# Patient Record
Sex: FEMALE | Race: OTHER | HISPANIC OR LATINO | Employment: UNEMPLOYED | URBAN - METROPOLITAN AREA
[De-identification: names, ages, dates, MRNs, and addresses within clinical notes are randomized per-mention and may not be internally consistent; named-entity substitution may affect disease eponyms.]

---
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
## 2021-06-12 ENCOUNTER — TRANSCRIBE ORDERS (OUTPATIENT)
Dept: ADMINISTRATIVE | Facility: HOSPITAL | Age: 21
End: 2021-06-12

## 2021-06-12 ENCOUNTER — APPOINTMENT (OUTPATIENT)
Dept: LAB | Facility: MEDICAL CENTER | Age: 21
End: 2021-06-12

## 2021-06-12 DIAGNOSIS — Z11.1 SCREENING EXAMINATION FOR PULMONARY TUBERCULOSIS: ICD-10-CM

## 2021-06-12 DIAGNOSIS — Z11.1 SCREENING EXAMINATION FOR PULMONARY TUBERCULOSIS: Primary | ICD-10-CM

## 2021-06-12 PROCEDURE — 86480 TB TEST CELL IMMUN MEASURE: CPT

## 2021-06-12 PROCEDURE — 36415 COLL VENOUS BLD VENIPUNCTURE: CPT

## 2021-06-17 LAB
GAMMA INTERFERON BACKGROUND BLD IA-ACNC: 0.02 IU/ML
M TB IFN-G BLD-IMP: NEGATIVE
M TB IFN-G CD4+ BCKGRND COR BLD-ACNC: 0.03 IU/ML
M TB IFN-G CD4+ BCKGRND COR BLD-ACNC: 0.12 IU/ML
MITOGEN IGNF BCKGRD COR BLD-ACNC: >10 IU/ML

## 2023-06-27 ENCOUNTER — TELEPHONE (OUTPATIENT)
Dept: PSYCHIATRY | Facility: CLINIC | Age: 23
End: 2023-06-27

## 2023-06-27 NOTE — TELEPHONE ENCOUNTER
Patient has been added to the Medication Management wait list without a referral     Insurance: [employer]  Insurance Type:    Commercial []   Medicaid [x]   [place] (if applicable)   Medicare []  [facility] Pre[facility]  Provider Preference: no prov pref  Virtual: Yes [] No [x]

## 2024-07-31 ENCOUNTER — TELEPHONE (OUTPATIENT)
Age: 24
End: 2024-07-31

## 2024-07-31 NOTE — TELEPHONE ENCOUNTER
"Behavioral Health Outpatient Intake Questions    Referred By   : SELF    Please advise interviewee that they need to answer all questions truthfully to allow for best care, and any misrepresentations of information may affect their ability to be seen at this clinic   => Was this discussed? Yes     If Minor Child (under age 18)    Who is/are the legal guardian(s) of the child?     Is there a custody agreement?      If \"YES\"- Custody orders must be obtained prior to scheduling the first appointment  In addition, Consent to Treatment must be signed by all legal guardians prior to scheduling the first appointment    If \"NO\"- Consent to Treatment must be signed by all legal guardians prior to scheduling the first appointment    Behavioral Health Outpatient Intake History -     Presenting Problem (in patient's own words):     Pt stated that a couple years ago pt had manic episodes and was Dx with schizophrenia. Pt has been receiving meds and therapy and has been improving.  Pt is now looking for someone local due to coming home from college.  Pt needs to switch over.    Are there any communication barriers for this patient?     No                                               If yes, please describe barriers:   If there is a unique situation, please refer to Tobi Grimaldo/Eneida Carter for final determination.    Are you taking any psychiatric medications? Yes     If \"YES\" -What are they  Abilify    If \"YES\" -Who prescribes? Dr. St, pt is switching PCPs due to moving.      Has the Patient previously received outpatient Talk Therapy or Medication Management from [facility]  No        If \"YES\"- When, Where and with Whom?         If \"NO\" -Has Patient received these services elsewhere?       If \"YES\" -When, Where, and with Whom? Psychiatrist/Therapist at [facility] for 2 years, New Therapist for 1 month, have not met with new Psychiatrist    Has the Patient abused alcohol or other substances in the last 6 months ? No  No concerns " "of substance abuse are reported.     If \"YES\" -What substance, How much, How often?     If illegal substance: Refer to [facility] (for [facility]) or [facility].   If Alcohol in excess of 10 drinks per week:  Refer to [facility] (for [facility]) or [facility]    Legal History-     Is this treatment court ordered? No   If \"yes \"send to :  Talk Therapy : Send to Tobi Grimaldo/Eneida Carter for final determination   Med Management: Send to Dr Lindo for final determination     Has the Patient been convicted of a felony?  No   If \"Yes\" send to -When, What?  Talk Therapy: Send to Tobi Carter for final determination   Med Management: Send to Dr Lindo for final determination     ACCEPTED as a patient Yes  If \"Yes\" Appointment Date:   Dr. Pillai 11/5 @ 2PM & 12/3 @ 330PM    Referred Elsewhere? No  If “Yes” - (Where? Ex: [facility], [facility], [facility], [facility], etc.)       Name of Insurance Co:[employer] [facility]  Insurance ID#[id number]   Insurance Phone #  If ins is primary or secondary?PRIMARY  If patient is a minor, parents information such as Name, D.O.B of guarantor.  "

## 2024-08-07 ENCOUNTER — TELEPHONE (OUTPATIENT)
Dept: PSYCHIATRY | Facility: CLINIC | Age: 24
End: 2024-08-07

## 2024-08-07 NOTE — TELEPHONE ENCOUNTER
One week follow up call for New Patient appointment with Cameron Pillai MD on 11/05/2024  was made on 08/07/2024 . Writer informed patient of New Patient paperwork needing to be completed 5 days prior to the appointment. Writer confirmed paperwork has been sent via Mail.    Appointment was made on: 07/31/2024

## 2024-11-04 ENCOUNTER — TELEPHONE (OUTPATIENT)
Age: 24
End: 2024-11-04

## 2024-11-04 NOTE — TELEPHONE ENCOUNTER
Patient is calling regarding cancelling an appointment.    Date/Time: 11/5/2024 at 2 pm New patient appt    Reason: pts work schedule changed since the appt was scheduled    Patient was rescheduled: YES [x] NO []  If yes, when was Patient reschedule for: 2/11/2025 at 2 pm. New patient appt    Patient requesting call back to reschedule: YES [] NO [x]

## 2024-11-11 ENCOUNTER — TELEPHONE (OUTPATIENT)
Dept: PSYCHIATRY | Facility: CLINIC | Age: 24
End: 2024-11-11

## 2024-11-11 NOTE — TELEPHONE ENCOUNTER
One week follow up call for New Patient appointment with   Cameron Pillai MD   on 02/11/2025 was made on 11/11/2024. Writer informed patient of New Patient paperwork needing to be completed 5 days prior to the appointment. Writer confirmed paperwork has been sent via Mail.    Appointment was made on: 11/04/2024

## 2025-01-09 ENCOUNTER — TELEPHONE (OUTPATIENT)
Dept: OTHER | Facility: OTHER | Age: 25
End: 2025-01-09

## 2025-01-09 ENCOUNTER — TELEPHONE (OUTPATIENT)
Age: 25
End: 2025-01-09

## 2025-01-09 NOTE — TELEPHONE ENCOUNTER
Patient called in to inquire if they could be added to a cancellation list for the 2.11.25 appointment.    If a sooner appointment opens, the patient was advised they would be contacted by [facility].

## 2025-01-09 NOTE — TELEPHONE ENCOUNTER
Enid stating she has an apt with psych coming up and she wanted to confirm it.  I cannot see upcoming apts.  Please call patient to confirm.    Thank you

## 2025-02-11 ENCOUNTER — OFFICE VISIT (OUTPATIENT)
Dept: PSYCHIATRY | Facility: CLINIC | Age: 25
End: 2025-02-11
Payer: COMMERCIAL

## 2025-02-11 ENCOUNTER — TELEPHONE (OUTPATIENT)
Age: 25
End: 2025-02-11

## 2025-02-11 DIAGNOSIS — F63.0 GAMBLING DISORDER, MODERATE: ICD-10-CM

## 2025-02-11 DIAGNOSIS — F12.20 CANNABIS USE DISORDER, MODERATE, DEPENDENCE (HCC): ICD-10-CM

## 2025-02-11 DIAGNOSIS — F31.32 BIPOLAR I DISORDER, MOST RECENT EPISODE DEPRESSED, MODERATE (HCC): Primary | ICD-10-CM

## 2025-02-11 PROBLEM — F31.64 BIPOLAR AFFECTIVE DISORDER, MIXED, SEVERE, WITH PSYCHOTIC BEHAVIOR (HCC): Status: RESOLVED | Noted: 2019-10-04 | Resolved: 2025-02-11

## 2025-02-11 PROBLEM — F31.5 BIPOLAR DISORDER, CURR EPISODE DEPRESSED, SEVERE, W/PSYCHOTIC FEATURES (HCC): Status: ACTIVE | Noted: 2019-10-04

## 2025-02-11 PROCEDURE — 90792 PSYCH DIAG EVAL W/MED SRVCS: CPT | Performed by: STUDENT IN AN ORGANIZED HEALTH CARE EDUCATION/TRAINING PROGRAM

## 2025-02-11 RX ORDER — ARIPIPRAZOLE 10 MG/1
10 TABLET ORAL DAILY
COMMUNITY
End: 2025-02-11 | Stop reason: SDUPTHER

## 2025-02-11 RX ORDER — FLUOXETINE 10 MG/1
10 CAPSULE ORAL DAILY
COMMUNITY
End: 2025-02-11 | Stop reason: SDUPTHER

## 2025-02-11 RX ORDER — ARIPIPRAZOLE 20 MG/1
20 TABLET ORAL DAILY
Qty: 30 TABLET | Refills: 0 | Status: SHIPPED | OUTPATIENT
Start: 2025-02-11 | End: 2025-02-21 | Stop reason: SDUPTHER

## 2025-02-11 NOTE — BH RECOVERY SUPPORT
[facility] PARTIAL PROGRAM REFERRAL     [facility]    Name and Date of Birth:  Enid Walsh 24 y.o. 2000    Date of Referral: February 11, 2025    Referral Source (Agency/Name): [facility] Demographics on file:  Yes     Emergency contact on file: Yes     Insurance on file: Yes     _____________________________________________      Presenting Symptoms and Stressors:      Please describe the reason for Referral: History of bipolar disorder with first manic episode occurring at age 19 ; worsening depression symptoms, anhedonia, anergia, poor self-care, decreased concentration. Increased feelings of guilt. Trying to hold job and improve mental health as she aspires to return back to college and study a PhD. Struggling with gambling addiction as well as cannabis use.    Stressors: drug use problems, family issues, and job stress    Symptoms:  worsening depression    Suicidal Ideation: None    Homicidal Ideation: None    Depressed Mood: Yes, anhedonia, sadness, hopelessness, low energy, low motivation, decreased interest, excessive guilt, poor concentration, ruminations, negative thoughts    Romelia/Hypomania: None now though past episodes    Psychosis: None now but history of paranoia    Agitation: No    Appetite Changes: increased appetite    Sleep Disturbance: hypersomnia    Access to Weapons:  No    Smoking Status: vaping nicotine    Substance Use:  Cannabis: current use, uses daily  If Use : Last use yesterday   If Use: Current SA treatment: none    Current Psychiatrist or Therapist:    Psychiatrist: Dr. Pillai  Therapist: None    Past Psychiatric Treatment: [facility]    If currently Inpatient - Date of Anticipated discharge: n/a    Diagnoses:  Bipolar Disorder, type I, depressed, moderate  Cannabis dependence    Do they Require Ambulatory Assistance: No    Communication Assistance: not required     Legal Issues: None        Cameron Pillai MD

## 2025-02-11 NOTE — BH TREATMENT PLAN
TREATMENT PLAN (Medication Management Only)        [facility]    Name and Date of Birth:  Enid Walsh 24 y.o. 2000  Date of Treatment Plan: February 11, 2025  Diagnosis/Diagnoses:    1. Bipolar I disorder, most recent episode depressed, moderate (HCC)    2. Gambling disorder, moderate    3. Cannabis use disorder, moderate, dependence (HCC)      Strengths/Personal Resources for Self-Care: supportive family, supportive friends.  Area/Areas of need (in own words): depression  1. Long Term Goal: improve control of depression.  Target Date:6 months - 8/11/2025  Person/Persons responsible for completion of goal: Enid  2.  Short Term Objective (s) - How will we reach this goal?:   A. Provider new recommended medication/dosage changes and/or continue medication(s): continue current medications as prescribed.  B. N/A.  C. N/A.  Target Date:6 months - 8/11/2025  Person/Persons Responsible for Completion of Goal: Enid  Progress Towards Goals: continuing treatment  Treatment Modality: medication management every 3 months  Review due 180 days from date of this plan: 6 months - 8/11/2025  Expected length of service: maintenance  My Physician/PA/NP and I have developed this plan together and I agree to work on the goals and objectives. I understand the treatment goals that were developed for my treatment.

## 2025-02-11 NOTE — TELEPHONE ENCOUNTER
Please make the Prior Authorization aware once the office note from today is signed so that the PA can be submitted for the pt thank you

## 2025-02-11 NOTE — PSYCH
Psychiatric Evaluation  [facility]       Name and Date of Birth:  Enid Walsh 24 y.o. 2000 MRN: [id number]    Date of Visit: February 11, 2025    Reason for visit: Initial psychiatric intake assessment    Chief complaint: I am looking for a new psychiatrist.    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 Enid is joined by her mother who provides collateral information.  Patient reports that while age 19 she had her first psychotic/manic episode.  Reports that she recently moved to college at that time and began to decompensate.  She was not sleeping for 3 to 4 days at a time, had decreased appetite and decreased self-care.  She became more paranoid, believes that the government in various agencies were tracking her phone.  She began to have delusions of reference, believing that celebrities were sending her messages through the computer and television.  She reports that she was hospitalized at that time and started antipsychotic medication, Latuda.  She then began outpatient treatment and took a semester of a leave of absence.  Her symptoms stabilized and she had done relatively well for a number of years, eventually transitioning to Abilify due to side effects from Latuda.  She then had another manic episode in her adali year of college and was again hospitalized.  She ultimately graduated college and is now residing [place] with her mother.    She reports that over the past few months she has been struggling with decompensated mental health symptoms.  Her mother is concerned particularly about increased depression.  She reports having poor ADLs and self-care.  She is more anhedonic, increased feelings of guilt, anergic, poor concentration with increased appetite and  weight gain.  Patient also admits to using cannabis more heavily, smoking THC on a daily basis.  She also has been struggling with a gambling addiction, spending large quantities of money with online casino apps.  Patient's mother is now in charge of managing her finances.  The patient reports that she has been feeling increasingly guilty about lack of control of spending money and cannabis use, feels that this has negatively impacted her ability to hold a job and is concerned that she aspires to eventually get a PhD and return back to college in the future.  She reports feeling that her previous medication regimen had stabilized her although seems to be less effective.  Due to lapse in psychiatric services she had been seeing intermittent PCP and providers in between, recently started on Prozac to assist with depressive symptoms.  She feels this has limited benefit thus far.  She denies currently any suicidal or homicidal ideations.  Denies any auditory or visual hallucinations.  Denies any paranoia or delusional symptoms currently.  No manic or hypomanic symptoms.  No history of eating disorder or obsessive-compulsive disorder.    Current Rating Scores:     Current PHQ-9   PHQ-2/9 Depression Screening             Psychiatric Review Of Systems:    Appetite: increased  Adverse eating: no  Weight changes: increased  Insomnia/sleeplessness: decreased  Fatigue/anergy: decreased  Anhedonia/lack of interest: decreased  Attention/concentration: decreased  Psychomotor agitation/retardation: decreased  Somatic symptoms: no  Anxiety/panic attack: no  Romelia/hypomania: past episodes  Hopelessness/helplessness/worthlessness: no  Self-injurious behavior/high-risk behavior: no  Suicidal ideation: no  Homicidal ideation: no  Auditory hallucinations: no  Visual hallucinations: no  Other perceptual disturbances: no  Delusional thinking: no  Obsessive/compulsive symptoms: no    Review Of Systems:    Constitutional negative   ENT  negative   Cardiovascular negative   Respiratory negative   Gastrointestinal negative   Genitourinary negative   Musculoskeletal negative   Integumentary negative   Neurological negative   Endocrine negative   Other Symptoms none, all other systems are negative       Family Psychiatric History:     Family History   Problem Relation Age of Onset    Diabetes Maternal Uncle     Schizophrenia Cousin              Past Psychiatric History:     Previous inpatient psychiatric admissions: 2 total, first at age 19 and most recent 2023; has completed PHP in the past  Previous inpatient/outpatient substance abuse rehabilitation: denied.  Present/previous outpatient psychiatric treatment: [facility] in past.  Present/previous psychotherapy: [facility] in past.  History of suicidal attempts/gestures: none.  History of violence/aggressive behaviors: denied.  Present/previous psychotropic medication use: Latuda, Abilify.    Substance Abuse History:  Social History     Substance and Sexual Activity   Drug Use Never       Nicotine Use: vapes nicotine daily  Alcohol Use: social use  Cannabis Use: daily THC use  Illicit Substance Use: denied    Social History:    Academic history: college graduate; BS degree  Marital history: single  Children: 0  Social support system: parents  Residential history: Resides in [place]; lives with parents  Vocational History: training as   Access to firearms: denies direct access to weapons/firearms.   Legal History: denied    Traumatic History:     Abuse:none is reported       Past Medical History:    Past Medical History:   Diagnosis Date    TGA/IVS (transposition of great arteries, intact ventricular septum)         History reviewed. No pertinent surgical history.  No Known Allergies    History Review:    The following portions of the patient's history were reviewed and updated as appropriate: allergies, current medications, past family history, past medical history, past social history, past  surgical history, and problem list.    OBJECTIVE:    Vital signs in last 24 hours:    There were no vitals filed for this visit.    Mental Status Evaluation:    Appearance age appropriate, casually dressed   Behavior cooperative, calm   Speech normal rate, normal volume, normal pitch, soft   Mood depressed, anxious   Affect constricted   Thought Processes organized, goal directed   Associations intact associations   Thought Content no overt delusions   Perceptual Disturbances: no auditory hallucinations, no visual hallucinations   Abnormal Thoughts  Risk Potential Suicidal ideation - None  Homicidal ideation - None  Potential for aggression - No   Orientation oriented to person, place, time/date, and situation   Memory recent and remote memory grossly intact   Consciousness alert and awake   Attention Span Concentration Span attention span and concentration are age appropriate   Intellect appears to be of average intelligence   Insight intact   Judgement intact   Muscle Strength and  Gait normal muscle strength and normal muscle tone, normal gait and normal balance   Motor Activity no abnormal movements   Language no difficulty naming common objects, no difficulty repeating a phrase, no difficulty writing a sentence   Fund of Knowledge adequate knowledge of current events  adequate fund of knowledge regarding past history  adequate fund of knowledge regarding vocabulary    Pain none   Pain Scale 0       Laboratory Results: I have personally reviewed all pertinent laboratory/tests results    Recent Labs (last 4 months):   No visits with results within 4 Month(s) from this visit.   Latest known visit with results is:   Appointment on 06/12/2021   Component Date Value    QFT Nil 06/12/2021 0.02     QFT TB1-NIL 06/12/2021 0.03     QFT TB2-NIL 06/12/2021 0.12     QFT Mitogen-NIL 06/12/2021 >10.00     QFT Final Interpretation 06/12/2021 Negative        Suicide/Homicide Risk Assessment:    Risk of Harm to Self:  The  following ratings are based on assessment at the time of the interview  Demographic risk factors include:   Historical Risk Factors include: chronic mood disorder  Recent Specific Risk Factors include: diagnosis of mood disorder  Protective Factors: no current suicidal ideation, ability to adapt to change, able to manage anger well, compliant with medications, compliant with mental health treatment, connection to community, contact with caregivers, effective coping skills, effective decision-making skills, effective problem solving skills, good health  Weapons: none. The following steps have been taken to ensure weapons are properly secured: not applicable  Based on today's assessment, Enid presents the following risk of harm to self: minimal    Risk of Harm to Others:  The following ratings are based on assessment at the time of the interview  Demographic Risk Factors include: 16-25 years of age.  Historical Risk Factors include: none.  Recent Specific Risk Factors include: none.  Protective Factors: no current homicidal ideation, ability to adapt to change, able to manage anger well, access to mental health treatment, compliant with medications, compliant with mental health treatment, connection to community, contact with caregivers, cultural beliefs, effective coping skills, effective decision-making skills, effective problem solving skills  Weapons: none. The following steps have been taken to ensure weapons are properly secured: not applicable  Based on today's assessment, Enid presents the following risk of harm to others: minimal    The following interventions are recommended: continue medication management, continue psychotherapy. Although patient's acute lethality risk is low, long-term/chronic lethality risk is mildly elevated in the presence of bipolar disorder. At the current moment, Enid is future-oriented, forward-thinking, and demonstrates ability to act in a self-preserving manner as  evidenced by volitionally presenting to the clinic today, seeking treatment.To mitigate future risk, patient should adhere to the recommendations of this writer, avoid alcohol/illicit substance use, utilize community-based resources and familiar support and prioritize mental health treatment.       Summary:  Enid Walsh is a 24-year-old female who carries a past psychiatric history significant for bipolar disorder, cannabis use disorder, and [place] disorder that presents to establish care today at outpatient office.  She is transferring care from past providers due to insurance changes.  She describes what appears to be a manic episode that occurred around age 19, has had 2 total manic episodes that are characterized by insomnia, increased goal-directed activity, racing thoughts, poor ADLs, some paranoia and ideas of reference.  She initiated treatment with Latuda and was stabilized for many years, eventually transitioning to Abilify which she feels has been more effective. She graduated college and has moved back home with her mother of whom is good support.    [birth year] Currently she is struggling with worsening depressive symptoms.  Poor ADLs, decreased motivation, decreased self-care, anergia.  She also has been utilizing more cannabis, smoking daily as well as involved with excessive gambling addiction. Her mother is now managing her finances. She does not appear acutely manic now; she feels that her gambling has been driven by guilt and stress. We discussed how Abilify could be contributing to gambling behaviors though she feels it has not had this effect. We discussed increasing Abilify and Prozac to target depression and mood symptoms. We discussed referral to PHP as she would benefit from higher level of care at this time to improve coping skills and insight.    Assessment & Plan  Bipolar I disorder, most recent episode depressed, moderate (HCC)    Orders:    FLUoxetine (PROzac) 20 mg capsule; Take 1 capsule  (20 mg total) by mouth daily    ARIPiprazole (ABILIFY) 20 MG tablet; Take 1 tablet (20 mg total) by mouth daily    Ambulatory Referral to [facility] or Partial Psych Program; Future    Gambling disorder, moderate         Cannabis use disorder, moderate, dependence (HCC)             Additonal Recommendations/Precautions:    Aware of need to follow up with family physician for medical issues  Aware of 24 hour and weekend coverage for urgent situations accessed by calling [facility] main practice number    Medications Risks/Benefits:      Risks, Benefits And Possible Side Effects Of Medications:    Risks, benefits, and possible side effects of medications explained to Enid including risk of parkinsonian symptoms, Tardive Dyskinesia and metabolic syndrome related to treatment with antipsychotic medications, risk of cardiovascular events in elderly related to treatment with antipsychotic medications, and risk of suicidality and serotonin syndrome related to treatment with antidepressants. She verbalizes understanding and agreement for treatment..    Controlled Medication Discussion:     Not applicable    Treatment Plan:    Completed and signed during the session: Yes - with Enid    This note was not shared with the patient due to reasonable likelihood of causing patient harm    Visit Time    Visit Start Time: 200pm  Visit Stop Time: 310pm  Total Visit Duration:  70 minutes    Cameron Pillai MD 02/11/25

## 2025-02-12 ENCOUNTER — TELEPHONE (OUTPATIENT)
Dept: PSYCHOLOGY | Facility: CLINIC | Age: 25
End: 2025-02-12

## 2025-02-12 NOTE — TELEPHONE ENCOUNTER
Called pt and offered PHP but pt stated she will have to think about it and also to contact her boss before committing and she will call me back later.

## 2025-02-12 NOTE — TELEPHONE ENCOUNTER
PA for Fluoxetine 20 mg SUBMITTED to [employer]     via    [x]CMM-KEY: [id number]  []Surescripts-Case ID #   []Availity-Auth ID # NDC #   []Faxed to plan   []Other website   []Phone call Case ID #     []PA sent as URGENT    All office notes, labs and other pertaining documents and studies sent. Clinical questions answered. Awaiting determination from insurance company.     Turnaround time for your insurance to make a decision on your Prior Authorization can take 7-21 business days.

## 2025-02-14 NOTE — TELEPHONE ENCOUNTER
PA for Fluoxetine 20 mg  NOT REQUIRED     Reason (screenshot if applicable)          Patient advised by          [x] MyChart Message  [] Phone call   []LMOM  []L/M to call office as no active Communication consent on file  []Unable to leave detailed message as VM not approved on Communication consent       Pharmacy advised by    [x]Fax  []Phone call

## 2025-02-17 ENCOUNTER — TELEPHONE (OUTPATIENT)
Age: 25
End: 2025-02-17

## 2025-02-17 NOTE — TELEPHONE ENCOUNTER
Contacted patient for Talk Therapy  wait list to verify needs of services in attempts to update wait list and offer outside resource guide. Writer verified Full Name, , Callback Number, Address, and Insurance. Writer spoke with patient who stated that she would like to remain on wait list for talk therapy. Pt is currently established for med mgmt.    2nd call attempt, remain on wait list    Preferences: Georgina Turpin, Female Provider Preferred, In-Person/Virtual for f/ups    Additional Resource Guide Request  Email: [email or web address]

## 2025-02-21 ENCOUNTER — OFFICE VISIT (OUTPATIENT)
Dept: PSYCHIATRY | Facility: CLINIC | Age: 25
End: 2025-02-21
Payer: COMMERCIAL

## 2025-02-21 DIAGNOSIS — F12.20 CANNABIS USE DISORDER, MODERATE, DEPENDENCE (HCC): ICD-10-CM

## 2025-02-21 DIAGNOSIS — F63.0 GAMBLING DISORDER, MODERATE: ICD-10-CM

## 2025-02-21 DIAGNOSIS — F31.32 BIPOLAR I DISORDER, MOST RECENT EPISODE DEPRESSED, MODERATE (HCC): Primary | ICD-10-CM

## 2025-02-21 DIAGNOSIS — F41.9 ANXIETY DISORDER, UNSPECIFIED TYPE: ICD-10-CM

## 2025-02-21 PROCEDURE — 99214 OFFICE O/P EST MOD 30 MIN: CPT | Performed by: STUDENT IN AN ORGANIZED HEALTH CARE EDUCATION/TRAINING PROGRAM

## 2025-02-21 PROCEDURE — 90833 PSYTX W PT W E/M 30 MIN: CPT | Performed by: STUDENT IN AN ORGANIZED HEALTH CARE EDUCATION/TRAINING PROGRAM

## 2025-02-21 RX ORDER — ARIPIPRAZOLE 20 MG/1
20 TABLET ORAL DAILY
Qty: 90 TABLET | Refills: 1 | Status: SHIPPED | OUTPATIENT
Start: 2025-02-21 | End: 2025-05-22

## 2025-02-21 NOTE — PSYCH
"Medication Management  [facility]         Name and Date of Birth:  Enid Walsh 24 y.o. 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 Enid states that since their previous outpatient psychiatric appointment, she is doing better.  She reports feeling that the increase in the Abilify and Prozac was met with good response.  She feels that she is sleeping better, feeling a little bit more motivated and energized.  She reports that she started her new job as a  and is getting adjusted to a second shift schedule.  She feels less down, depressed, hopeless.  She feels less anxious and ruminative.  She reports that she continues to struggle at times with urges to mascorro, reports that she gambled 1 time in the past week and spent approximately $40.  She still has some guilt about this although is trying to reconcile with her family maintain good relationships.  She reports that she did argue with her mother a few times last week although feels that this is getting better.  She does have trouble with the boundaries and limits that her mother is trying to impose although does understand that it is out of concern. She expressed goals of looking into PhD programs in the future. No reported SI, HI, A/VH.    Current Rating Scores:   Current PHQ-9   PHQ-2/9 Depression Screening    Little interest or pleasure in doing things: 1 - several days  Feeling down, depressed, or hopeless: 2 - more than half the days  Trouble falling or staying asleep, or sleeping too much: 0 - not at all  Feeling tired or having little energy: 3 - nearly every " day  Poor appetite or overeatin - several days  Feeling bad about yourself - or that you are a failure or have let yourself or your family down: 3 - nearly every day  Trouble concentrating on things, such as reading the newspaper or watching television: 2 - more than half the days  Moving or speaking so slowly that other people could have noticed. Or the opposite - being so fidgety or restless that you have been moving around a lot more than usual: 0 - not at all         Psychiatric Review Of Systems:    Appetite: no  Adverse eating: no  Weight changes: no  Insomnia/sleeplessness: no  Fatigue/anergy: no  Anhedonia/lack of interest: no  Attention/concentration: no  Psychomotor agitation/retardation: no  Somatic symptoms: no  Anxiety/panic attack: no  Romelia/hypomania: no  Hopelessness/helplessness/worthlessness: no  Self-injurious behavior/high-risk behavior: no  Suicidal ideation: no  Homicidal ideation: no  Auditory hallucinations: no  Visual hallucinations: no  Other perceptual disturbances: no  Delusional thinking: no  Obsessive/compulsive symptoms: no    Review Of Systems:      Constitutional negative   ENT negative   Cardiovascular negative   Respiratory negative   Gastrointestinal negative   Genitourinary negative   Musculoskeletal negative   Integumentary negative   Neurological negative   Endocrine negative   Other Symptoms none, all other systems are negative     History Review:   The following portions of the patient's history were reviewed and updated as appropriate: allergies, current medications, past family history, past medical history, past social history, past surgical history, and problem list. Previous progress notes/assessments from other providers reviewed as available.    Past Medical History:    Past Medical History:   Diagnosis Date    TGA/IVS (transposition of great arteries, intact ventricular septum)         No Known Allergies    Substance Abuse History:    Social History     Substance  and Sexual Activity   Alcohol Use Not Currently     Social History     Substance and Sexual Activity   Drug Use Never       Social History:    Social History     Socioeconomic History    Marital status: Single     Spouse name: Not on file    Number of children: Not on file    Years of education: Not on file    Highest education level: Not on file   Occupational History    Not on file   Tobacco Use    Smoking status: Never    Smokeless tobacco: Never   Substance and Sexual Activity    Alcohol use: Not Currently    Drug use: Never    Sexual activity: Never   Other Topics Concern    Not on file   Social History Narrative    Not on file     Social Drivers of Health     Financial Resource Strain: Low Risk  (7/31/2023)    Received from [facility]    Overall Financial Resource Strain (CARDIA)     Difficulty of Paying Living Expenses: Not very hard   Food Insecurity: No Food Insecurity (7/31/2023)    Received from [facility]    Hunger Vital Sign     Worried About Running Out of Food in the Last Year: Never true     Ran Out of Food in the Last Year: Never true   Transportation Needs: No Transportation Needs (7/31/2023)    Received from [facility]    PRAPARE - Transportation     Lack of Transportation (Medical): No     Lack of Transportation (Non-Medical): No   Physical Activity: Not on file   Stress: Stress Concern Present (7/31/2023)    Received from [facility]    [employer] [place] [employer] - Occupational Stress Questionnaire     Feeling of Stress : To some extent   Social Connections: Moderately Integrated (7/31/2023)    Received from [facility]    Social Connection and Isolation Panel [NHANES]     Frequency of Communication with Friends and Family: More than three times a week     Frequency of Social Gatherings with Friends and Family: Three times a week     Attends Anglican Services: More than 4 times per year      Active Member of Clubs or Organizations: Yes     Attends Club or Organization Meetings: More than 4 times per year     Marital Status: Never    Intimate Partner Violence: Not At Risk (7/31/2023)    Received from [facility]    Humiliation, Afraid, Rape, and Kick questionnaire     Fear of Current or Ex-Partner: No     Emotionally Abused: No     Physically Abused: No     Sexually Abused: No   Housing Stability: Low Risk  (7/31/2023)    Received from [facility]    Housing Stability Vital Sign     Unable to Pay for Housing in the Last Year: No     Number of Places Lived in the Last Year: 2     Unstable Housing in the Last Year: No       Family Psychiatric History:     Family History   Problem Relation Age of Onset    Diabetes Maternal Uncle     Schizophrenia Cousin             OBJECTIVE:     Vital signs in last 24 hours:    There were no vitals filed for this visit.    Mental Status Evaluation:    Appearance age appropriate, casually dressed   Behavior cooperative, calm   Speech normal rate, normal volume, normal pitch   Mood euthymic   Affect normal range and intensity, appropriate   Thought Processes organized, goal directed   Associations intact associations   Thought Content no overt delusions   Perceptual Disturbances: no auditory hallucinations, no visual hallucinations   Abnormal Thoughts  Risk Potential Suicidal ideation - None  Homicidal ideation - None  Potential for aggression - No   Orientation oriented to person, place, time/date, and situation   Memory recent and remote memory grossly intact   Consciousness alert and awake   Attention Span Concentration Span attention span and concentration are age appropriate   Intellect appears to be of average intelligence   Insight intact   Judgement intact   Muscle Strength and  Gait normal muscle strength and normal muscle tone, normal gait and normal balance   Motor activity no abnormal movements   Fund of Knowledge adequate  knowledge of current events  adequate fund of knowledge regarding past history  adequate fund of knowledge regarding vocabulary    Pain none   Pain Scale 0       Laboratory Results: I have personally reviewed all pertinent laboratory/tests results    Recent Labs (last 4 months):   No visits with results within 4 Month(s) from this visit.   Latest known visit with results is:   Appointment on 06/12/2021   Component Date Value    QFT Nil 06/12/2021 0.02     QFT TB1-NIL 06/12/2021 0.03     QFT TB2-NIL 06/12/2021 0.12     QFT Mitogen-NIL 06/12/2021 >10.00     QFT Final Interpretation 06/12/2021 Negative          Summary:  Enid Walsh is a 24-year-old female who carries a past psychiatric history significant for bipolar disorder, cannabis use disorder, and [place] disorder that presents to establish care today at outpatient office.  She is transferring care from past providers due to insurance changes.  She describes what appears to be a manic episode that occurred around age 19, has had 2 total manic episodes that are characterized by insomnia, increased goal-directed activity, racing thoughts, poor ADLs, some paranoia and ideas of reference.  She initiated treatment with [facility] and was stabilized for many years, eventually transitioning to Abilify which she feels has been more effective. She graduated college and has moved back home with her mother of whom is good support.    2/11/24 Currently she is struggling with worsening depressive symptoms.  Poor ADLs, decreased motivation, decreased self-care, anergia.  She also has been utilizing more cannabis, smoking daily as well as involved with excessive gambling addiction. Her mother is now managing her finances. She does not appear acutely manic now; she feels that her gambling has been driven by guilt and stress. We discussed how Abilify could be contributing to gambling behaviors though she feels it has not had this effect. We discussed increasing Abilify and Prozac to  target depression and mood symptoms. We discussed referral to PHP as she would benefit from higher level of care at this time to improve coping skills and insight.    2/21/25 She appears better today; more motivated and goal directed. Feels better in terms of depression. Also feels less anxious. We discussed recovery services for gambling addiction as well as use of naltrexone to reduce urges.  She does not want to try any new medications although expressed that she would consider trying gambling Anonymous.  She is interested in looking into training her dog to be a psychiatric service dog; I am in agreement for this.  She will review material and see exactly what is needed and we will fill out any paperwork if needed. Signed MELVA for there therapist (Gabbie Persaud) and will coordinate care in the future with her. She is not interested in PHP at this time.    Assessment & Plan  Bipolar I disorder, most recent episode depressed, moderate (HCC)    Orders:    ARIPiprazole (ABILIFY) 20 MG tablet; Take 1 tablet (20 mg total) by mouth daily    FLUoxetine (PROzac) 20 mg capsule; Take 1 capsule (20 mg total) by mouth daily    Anxiety disorder, unspecified type    Orders:    FLUoxetine (PROzac) 20 mg capsule; Take 1 capsule (20 mg total) by mouth daily    Gambling disorder, moderate  Encouraged use of Gambling Anonymous.        Cannabis use disorder, moderate, dependence (HCC)  Encouraged cessation.           Additonal Recommendations/Precautions:    Aware of need to follow up with family physician for medical issues  Aware of 24 hour and weekend coverage for urgent situations accessed by calling [facility] main practice number    Medications Risks/Benefits      Risks, Benefits And Possible Side Effects Of Medications:    Risks, benefits, and possible side effects of medications explained to Enid including risk of parkinsonian symptoms, Tardive Dyskinesia and metabolic syndrome related to treatment  with antipsychotic medications, risk of cardiovascular events in elderly related to treatment with antipsychotic medications, and risk of suicidality and serotonin syndrome related to treatment with antidepressants. She verbalizes understanding and agreement for treatment..    Controlled Medication Discussion:     Enid has been filling controlled prescriptions on time as prescribed according to [place] Prescription Drug Monitoring Program    Psychotherapy Provided:     Individual psychotherapy provided: Yes  Counseling was provided during the session today for 20 minutes.  Recent stressor including family problems, family conflict, family issues, recent medication change, housing issues, everyday stressors, chronic anxiety, and gambling  discussed with Enid.   Coping skills reviewed with Enid.   Reassurance and supportive therapy provided.      Treatment Plan:    Completed and signed during the session: Not applicable - Treatment Plan not due at this session    This note was not shared with the patient due to reasonable likelihood of causing patient harm    Visit Time    Visit Start Time: 1245pm  Visit Stop Time: 120pm  Total Visit Duration:  35 minutes        Cameron Pillai MD 02/21/25

## 2025-02-26 ENCOUNTER — TELEPHONE (OUTPATIENT)
Dept: PSYCHIATRY | Facility: CLINIC | Age: 25
End: 2025-02-26

## 2025-02-26 NOTE — TELEPHONE ENCOUNTER
Left voicemail informing patient and/or parent/guardian of the Psych Encounter form needing to be signed as a requirement from the insurance company for billing purposes. Patient can access form via [employer] and sign electronically.     Please make patient aware this form must be signed for each visit as a requirement to continue future visits with provider.

## 2025-03-05 ENCOUNTER — TELEPHONE (OUTPATIENT)
Dept: PSYCHIATRY | Facility: CLINIC | Age: 25
End: 2025-03-05

## 2025-05-21 ENCOUNTER — TELEPHONE (OUTPATIENT)
Age: 25
End: 2025-05-21

## 2025-05-21 NOTE — TELEPHONE ENCOUNTER
Patient has been added to the Talk Therapy wait list without a referral.    Insurance: Gemma Solano  Insurance Type:    Commercial []   Medicaid [x]   [facility] (if applicable)   Medicare []  Location Preference: [place]  Provider Preference: Anyone  Virtual: Yes [x] No []  Were outside resources sent: Yes [x]

## 2025-06-06 ENCOUNTER — OFFICE VISIT (OUTPATIENT)
Dept: PSYCHIATRY | Facility: CLINIC | Age: 25
End: 2025-06-06
Payer: COMMERCIAL

## 2025-06-06 DIAGNOSIS — F41.9 ANXIETY DISORDER, UNSPECIFIED TYPE: ICD-10-CM

## 2025-06-06 DIAGNOSIS — F31.32 BIPOLAR I DISORDER, MOST RECENT EPISODE DEPRESSED, MODERATE (HCC): Primary | ICD-10-CM

## 2025-06-06 PROCEDURE — 90833 PSYTX W PT W E/M 30 MIN: CPT | Performed by: STUDENT IN AN ORGANIZED HEALTH CARE EDUCATION/TRAINING PROGRAM

## 2025-06-06 PROCEDURE — 99214 OFFICE O/P EST MOD 30 MIN: CPT | Performed by: STUDENT IN AN ORGANIZED HEALTH CARE EDUCATION/TRAINING PROGRAM

## 2025-06-06 RX ORDER — ARIPIPRAZOLE 20 MG/1
20 TABLET ORAL DAILY
Qty: 90 TABLET | Refills: 1 | Status: SHIPPED | OUTPATIENT
Start: 2025-06-06 | End: 2025-09-04

## 2025-06-06 NOTE — PSYCH
"MEDICATION MANAGEMENT NOTE    Name: Enid Walsh      : 2000      MRN: [id number]  Encounter Provider: Cameron Pillai MD  Encounter Date: 2025   Encounter department: [facility]    Insurance: Payor: [facility] [place] / Plan: [employer] MEDICAID / Product Type: Medicaid HMO /      Reason for Visit: \"I am doing OK\"    Summary: Enid Walsh is a 24-year-old female who carries a past psychiatric history significant for bipolar disorder, cannabis use disorder, and [place] disorder that presents to establish care today at outpatient office.  She is transferring care from past providers due to insurance changes.  She describes what appears to be a manic episode that occurred around age 19, has had 2 total manic episodes that are characterized by insomnia, increased goal-directed activity, racing thoughts, poor ADLs, some paranoia and ideas of reference.  She initiated treatment with Latuda and was stabilized for many years, eventually transitioning to Abilify which she feels has been more effective. She graduated college and has moved back home with her mother of whom is good support.    2025 Reports increased depressive symptoms; has had some decreased energy and decreased motivation.  Struggling with focus and completing tasks.  There is some relationship discord with her mother.  We will increase Prozac to target depression symptoms, she did sign a release of information for her therapist Gabbie Persaud. Message sent to follow up with intake to get psychotherapy established here.    :  Assessment & Plan  Bipolar I disorder, most recent episode depressed, moderate (HCC)    Orders:    FLUoxetine (PROzac) 20 mg capsule; Take 2 capsules (40 mg total) by mouth daily    ARIPiprazole (ABILIFY) 20 MG tablet; Take 1 tablet (20 mg total) by mouth daily    Anxiety disorder, unspecified type    Orders:    FLUoxetine (PROzac) 20 mg capsule; Take 2 capsules " (40 mg total) by mouth daily        Treatment Recommendations:  Educated about diagnosis and treatment modalities. Verbalizes understanding and agreement with the treatment plan.  Discussed self monitoring of symptoms, and symptom monitoring tools.  Discussed medications and if treatment adjustment was needed or desired.  Aware of 24 hour and weekend coverage for urgent situations accessed by calling [facility] main practice number  I am scheduling this patient out for greater than 3 months: Yes - If sooner appointment needed, patient will reach out    Medications Risks/Benefits:      Risks, Benefits And Possible Side Effects Of Medications:    Risks, benefits, and possible side effects of medications explained to Enid including risk of cardiovascular events in elderly related to treatment with antipsychotic medications and risk of suicidality and serotonin syndrome related to treatment with antidepressants. She (or legal representative) verbalizes understanding and agreement for treatment.    Controlled Medication Discussion:     Not applicable      History of Present Illness     Enid reports increased depression over the past few months. Notes that her motivation is decreased, energy levels are lower.  She is struggling with completing tasks, motivating herself to do things at home such as cleaning or studying/applying to graduate programs.  She reports that she continues to intermittently mascorro and impulsively by things which then leads to increased feelings of guilt.  There is increased stress with financial stressors.  She denies today any psychotic symptoms or paranoia.  No perceptual disturbances.  No manic symptoms.    Sleep is fair.  Appetite levels adequate, energy level is decreased.    Enid denies any side effects from medications unless noted above.    Review Of Systems: A review of systems is obtained and is negative except for the pertinent positives listed in HPI/Subjective  above.      Current Rating Scores:       Areas of Improvement: reviewed in HPI/Subjective Section and reviewed in Assessment and Plan Section    Past Psychiatric History: (unchanged information from previous note copied and updated)    Previous inpatient psychiatric admissions: 2 total, first at age 19 and most recent 2023; has completed PHP in the past  Previous inpatient/outpatient substance abuse rehabilitation: denied.  Present/previous outpatient psychiatric treatment: [facility] in past.  Present/previous psychotherapy: [facility] in past.  History of suicidal attempts/gestures: none.  History of violence/aggressive behaviors: denied.  Present/previous psychotropic medication use: Latuda, Abilify.    Traumatic History: (unchanged information from previous note copied and updated)    Abuse:none is reported     Past Medical History[1]     Past Surgical History[2]  Allergies: Allergies[3]    Current Medications[4]    Substance Abuse History:    Social History     Substance and Sexual Activity   Alcohol Use Not Currently     Social History     Substance and Sexual Activity   Drug Use Never       Social History:    Social History     Socioeconomic History    Marital status: Single     Spouse name: Not on file    Number of children: Not on file    Years of education: Not on file    Highest education level: Not on file   Occupational History    Not on file   Tobacco Use    Smoking status: Never    Smokeless tobacco: Never   Substance and Sexual Activity    Alcohol use: Not Currently    Drug use: Never    Sexual activity: Never   Other Topics Concern    Not on file   Social History Narrative    Not on file     Social Drivers of Health     Financial Resource Strain: Low Risk  (7/31/2023)    Received from [facility]    Overall Financial Resource Strain (CARDIA)     Difficulty of Paying Living Expenses: Not very hard   Food Insecurity: No Food Insecurity (7/31/2023)    Received from [facility]     Hunger Vital Sign     Within the past 12 months, you worried that your food would run out before you got the money to buy more.: Never true     Within the past 12 months, the food you bought just didn't last and you didn't have money to get more.: Never true   Transportation Needs: No Transportation Needs (7/31/2023)    Received from [facility]    PRAPARE - Transportation     Lack of Transportation (Medical): No     Lack of Transportation (Non-Medical): No   Physical Activity: Not on file   Stress: Stress Concern Present (7/31/2023)    Received from [facility]    [employer] - Occupational Stress Questionnaire     Feeling of Stress : To some extent   Social Connections: Moderately Integrated (7/31/2023)    Received from [facility]    Social Connection and Isolation Panel     In a typical week, how many times do you talk on the phone with family, friends, or neighbors?: More than three times a week     How often do you get together with friends or relatives?: Three times a week     How often do you attend Worship or Mandaeism services?: More than 4 times per year     Do you belong to any clubs or organizations such as Worship groups, unions, fraternal or athletic groups, or school groups?: Yes     How often do you attend meetings of the clubs or organizations you belong to?: More than 4 times per year     Are you , , , , never , or living with a partner?: Never    Intimate Partner Violence: Not At Risk (7/31/2023)    Received from [facility]    Humiliation, Afraid, Rape, and Kick questionnaire     Fear of Current or Ex-Partner: No     Emotionally Abused: No     Physically Abused: No     Sexually Abused: No   Housing Stability: Low Risk  (7/31/2023)    Received from [facility]    Housing Stability Vital Sign     Unable to Pay for Housing in the Last Year: No      Number of Places Lived in the Last Year: 2     Unstable Housing in the Last Year: No       Family Psychiatric History:     Family History[5]    Medical History Reviewed by provider this encounter:          Objective   There were no vitals taken for this visit.     Mental Status Evaluation:    Appearance age appropriate, casually dressed   Behavior cooperative, calm   Speech normal rate, normal volume, normal pitch, spontaneous   Mood depressed   Affect constricted   Thought Processes organized, goal directed   Thought Content no overt delusions, negative thinking   Perceptual Disturbances: no auditory hallucinations, no visual hallucinations   Abnormal Thoughts  Risk Potential Suicidal ideation - None  Homicidal ideation - None  Potential for aggression - No   Orientation oriented to person, place, time/date, and situation   Memory recent and remote memory grossly intact   Consciousness alert and awake   Attention Span Concentration Span attention span and concentration are age appropriate   Intellect appears to be of average intelligence   Insight intact   Judgement intact   Muscle Strength and  Gait normal muscle strength and normal muscle tone, normal gait and normal balance   Motor activity no abnormal movements   Language no difficulty naming common objects, no difficulty repeating a phrase, no difficulty writing a sentence   Fund of Knowledge adequate knowledge of current events  adequate fund of knowledge regarding past history  adequate fund of knowledge regarding vocabulary    Pain none   Pain Scale 0       Laboratory Results: I have personally reviewed all pertinent laboratory/tests results    Recent Labs (last 2 months):   No visits with results within 2 Month(s) from this visit.   Latest known visit with results is:   Appointment on 06/12/2021   Component Date Value    QFT Nil 06/12/2021 0.02     QFT TB1-NIL 06/12/2021 0.03     QFT TB2-NIL 06/12/2021 0.12     QFT Mitogen-NIL 06/12/2021 >10.00     QFT  Final Interpretation 06/12/2021 Negative        Suicide/Homicide Risk Assessment:    Risk of Harm to Self:  The following ratings are based on assessment at the time of the interview  Demographic Risk Factors include: , age: young adult (15-24)  Historical Risk Factors include: history of anxiety, chronic mood disorder  Recent Specific Risk Factors include: diagnosis of mood disorder  Protective Factors: no current suicidal ideation, ability to adapt to change, able to make plans for the future, able to manage anger well, access to mental health treatment, compliant with medications, compliant with mental health treatment, contact with caregivers, demonstrates self preserving tendencies, effective coping skills, effective decision-making skills, effective problem solving skills, future oriented  Weapons/Firearms: none. The following steps have been taken to ensure weapons are properly secured: not applicable  Based on today's assessment, Enid presents the following risk of harm to self: minimal    Risk of Harm to Others:  The following ratings are based on assessment at the time of the interview  Demographic Risk Factors include: none  Historical Risk Factors include: none  Recent Specific Risk Factors include: none  Protective Factors: no current homicidal ideation  Weapons/Firearms: none. The following steps have been taken to ensure weapons are properly secured: not applicable  Based on today's assessment, Enid presents the following risk of harm to others: minimal    The following interventions are recommended: Continue medication management. No other intervention changes indicated at this time.    Psychotherapy Provided:     Individual psychotherapy provided: Yes   Counseling was provided during the session today for 17 minutes.  Medication education provided to Enid.  Recent stressors discussed with Enid including family conflict, family issues, job stress, health issues, recent medication change,  and everyday stressors.  Coping skills reviewed with Enid.   Supportive therapy provided.     Treatment Plan:    Completed and signed during the session: Not applicable - Treatment Plan not due at this session.    Goals: Progress towards Treatment Plan goals - Yes, progressing, as evidenced by subjective findings in HPI/Subjective Section and in Assessment and Plan Section    Depression Follow-up Plan Completed: Not applicable    Note Share: This note was shared with patient.      Administrative Statements       Visit Time  Visit Start Time: 1250   Visit Stop Time: 120   Total Visit Duration: 30 minutes    Cameron Pillai MD 06/06/25         [1]   Past Medical History:  Diagnosis Date    TGA/IVS (transposition of great arteries, intact ventricular septum)    [2] No past surgical history on file.  [3] No Known Allergies  [4]   Current Outpatient Medications   Medication Sig Dispense Refill    ARIPiprazole (ABILIFY) 20 MG tablet Take 1 tablet (20 mg total) by mouth daily 90 tablet 1    FLUoxetine (PROzac) 20 mg capsule Take 2 capsules (40 mg total) by mouth daily 180 capsule 0     No current facility-administered medications for this visit.   [5]   Family History  Problem Relation Name Age of Onset    Diabetes Maternal Uncle      Schizophrenia Cousin

## 2025-06-11 ENCOUNTER — TELEPHONE (OUTPATIENT)
Dept: PSYCHIATRY | Facility: CLINIC | Age: 25
End: 2025-06-11

## 2025-06-19 ENCOUNTER — TELEPHONE (OUTPATIENT)
Dept: PSYCHIATRY | Facility: CLINIC | Age: 25
End: 2025-06-19

## 2025-06-19 NOTE — TELEPHONE ENCOUNTER
Left voicemail informing patient and/or parent/guardian of the Psych Encounter form needing to be signed as a requirement from the insurance company for billing purposes. Patient can access form via [email or web address] and sign electronically.     Please make patient aware this form must be signed for each visit as a requirement to continue future visits with provider.

## 2025-06-25 ENCOUNTER — TELEPHONE (OUTPATIENT)
Age: 25
End: 2025-06-25

## 2025-06-25 DIAGNOSIS — F31.32 BIPOLAR I DISORDER, MOST RECENT EPISODE DEPRESSED, MODERATE (HCC): ICD-10-CM

## 2025-06-25 RX ORDER — ARIPIPRAZOLE 20 MG/1
20 TABLET ORAL DAILY
Qty: 90 TABLET | Refills: 1 | Status: SHIPPED | OUTPATIENT
Start: 2025-06-25 | End: 2025-09-23

## 2025-06-25 NOTE — TELEPHONE ENCOUNTER
The patient called to inquire if she could email the MELVA into the office. The writer checked with the clerical department, and advised the patient that this form would need to be brought in and signed at the front window with staff.

## 2025-06-25 NOTE — TELEPHONE ENCOUNTER
Medication Refill Request     Name of Medication ARIPiprazole (ABILIFY) 20 MG tablet   Dose/Frequency Take 1 tablet (20 mg total) by mouth daily   Quantity 90    Verified pharmacy   [x]  Verified ordering Provider   [x]  Does patient have enough for the next 3 days? Yes [x] No []  Does patient have a follow-up appointment scheduled? Yes [x] No []    If so when is appointment: 10.17.25 @ 12:00pm

## 2025-07-15 ENCOUNTER — TELEPHONE (OUTPATIENT)
Age: 25
End: 2025-07-15

## 2025-07-17 ENCOUNTER — TELEPHONE (OUTPATIENT)
Dept: PSYCHIATRY | Facility: CLINIC | Age: 25
End: 2025-07-17

## 2025-07-17 ENCOUNTER — OFFICE VISIT (OUTPATIENT)
Dept: BEHAVIORAL/MENTAL HEALTH CLINIC | Facility: CLINIC | Age: 25
End: 2025-07-17
Payer: COMMERCIAL

## 2025-07-17 DIAGNOSIS — F31.5 BIPOLAR DISORDER, CURR EPISODE DEPRESSED, SEVERE, W/PSYCHOTIC FEATURES (HCC): Primary | ICD-10-CM

## 2025-07-17 PROCEDURE — 90791 PSYCH DIAGNOSTIC EVALUATION: CPT

## 2025-07-17 NOTE — TELEPHONE ENCOUNTER
At the end of today's appointment the patient signed a MELVA for Arleen MUHAMMAD and Dr. Pillai to share medical information with her former therapist Gabbie Bains.
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
## 2025-07-17 NOTE — PSYCH
"Behavioral Health Psychotherapy Progress Note    Psychotherapy Provided: Individual Psychotherapy     No diagnosis found.    Goals addressed in session: Goal 1- Completing Initial Assessment and Safety Plan     DATA: Pt came to initial session within coordinated time to complete the initial assessment, along with her safety plan. During today's contact, [facility] was able to get acquainted with pt by discussing the confidentiality terms to establish a healthy therapeutic rapport, along with addressing the objectives for today's contact. Pt disclosed during session being struggling with major depressive disorder traits associated to possible past social triggers that haven't been managed assertively (interactions with college roommate that trigger pt emotionally and psychologically). Pt shared that within the transition of college, and COVID-19 isolation cycle, she experienced severe mood dysregulations, along with traits of schizophrenia which include: Delusions within distorted cognitive processing, no active visual, auditory, nor tactile or sensorial hallucinations. Pt identified been struggling with possible gambling addiction traits describing been playing online black alethea as a coping mechanism to decrease boredom, pt described feeling the \"need and craving to play it\". Pt shared been struggling with her parents' separation, struggling with the stages of grief by processing the loss of their relationship. 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 Pt mentioned that it had caused increased of stress by trying to care for her mother. Pt shared being under MM Tx to address described symptoms.     Pt's emotions and cognitions were validated throughout contact, along with receiving positive verbal reinforcements for her courage and determination to consider therapeutic interventions as part " "of her self-care applications. Before contact's completion, pt was able to create her safety plan, along with being therapeutically challenged to consider reflecting on at least 2 Tx goals to be discussed during her next contact. Pt agreed to consider the suggestion accordingly, along with scheduling her next f/u contact for 7/31/25 at 11:00 a.m. No SI or HI were disclosed within contact.    During this session, this clinician used the following therapeutic modalities: Client-centered Therapy    Substance Abuse was addressed during this session. If the client is diagnosed with a co-occurring substance use disorder, please indicate any changes in the frequency or amount of use: Please refer to CORTES assessment. Stage of change for addressing substance use diagnoses: Pre-contemplation    ASSESSMENT:  Enid Walsh presents with a Euthymic/ normal and Depressed mood.     her affect is Normal range and intensity and Tearful, which is congruent, with her mood and the content of the session. The client has not  made progress on their goals.    Enid Walsh presents with a none risk of suicide, none risk of self-harm, and none risk of harm to others.    For any risk assessment that surpasses a \"low\" rating, a safety plan must be developed.    A safety plan was indicated: yes  If yes, describe in detail: Pt created a new safety plan, however, one was developed within contact with assigned psychiatric provider.     PLAN: Between sessions, Enid Walsh will continue to address core emotional, cognitive and behavioral issues that may impact her life's stability. At the next session, the therapist will use Client-centered Therapy to address her therapeutic goals towards the development of her Tx plan.    Behavioral Health Treatment Plan and Discharge Planning: Enid Walsh is aware of and agrees to continue to work on their treatment plan. They have identified and are working toward their discharge goals. yes    Depression Follow-up " Plan Completed: No    Visit start and stop times:    07/17/25  Start Time: 1100  Stop Time: 1200  Total Visit Time: 60 minutes

## 2025-07-31 ENCOUNTER — SOCIAL WORK (OUTPATIENT)
Dept: BEHAVIORAL/MENTAL HEALTH CLINIC | Facility: CLINIC | Age: 25
End: 2025-07-31
Payer: COMMERCIAL

## 2025-07-31 ENCOUNTER — TELEPHONE (OUTPATIENT)
Age: 25
End: 2025-07-31

## 2025-07-31 DIAGNOSIS — F31.5 BIPOLAR DISORDER, CURR EPISODE DEPRESSED, SEVERE, W/PSYCHOTIC FEATURES (HCC): Primary | ICD-10-CM

## 2025-07-31 PROCEDURE — 90837 PSYTX W PT 60 MINUTES: CPT

## 2025-08-08 ENCOUNTER — TELEMEDICINE (OUTPATIENT)
Dept: BEHAVIORAL/MENTAL HEALTH CLINIC | Facility: CLINIC | Age: 25
End: 2025-08-08
Payer: COMMERCIAL

## 2025-08-08 DIAGNOSIS — F31.5 BIPOLAR DISORDER, CURR EPISODE DEPRESSED, SEVERE, W/PSYCHOTIC FEATURES (HCC): Primary | ICD-10-CM

## 2025-08-08 DIAGNOSIS — F12.11 CANNABIS ABUSE, IN REMISSION: ICD-10-CM

## 2025-08-08 DIAGNOSIS — F63.0: ICD-10-CM

## 2025-08-08 PROCEDURE — 90837 PSYTX W PT 60 MINUTES: CPT

## 2025-08-13 ENCOUNTER — TELEPHONE (OUTPATIENT)
Dept: PSYCHIATRY | Facility: CLINIC | Age: 25
End: 2025-08-13

## 2025-08-15 ENCOUNTER — TELEMEDICINE (OUTPATIENT)
Dept: BEHAVIORAL/MENTAL HEALTH CLINIC | Facility: CLINIC | Age: 25
End: 2025-08-15
Payer: COMMERCIAL

## 2025-08-20 ENCOUNTER — TELEPHONE (OUTPATIENT)
Dept: BEHAVIORAL/MENTAL HEALTH CLINIC | Facility: CLINIC | Age: 25
End: 2025-08-20

## 2025-08-20 ENCOUNTER — TELEPHONE (OUTPATIENT)
Age: 25
End: 2025-08-20

## 2025-08-21 ENCOUNTER — OFFICE VISIT (OUTPATIENT)
Dept: PSYCHIATRY | Facility: CLINIC | Age: 25
End: 2025-08-21
Payer: COMMERCIAL

## 2025-08-21 DIAGNOSIS — F63.0 GAMBLING DISORDER, MODERATE: ICD-10-CM

## 2025-08-21 DIAGNOSIS — F12.20 CANNABIS USE DISORDER, MODERATE, DEPENDENCE (HCC): Primary | ICD-10-CM

## 2025-08-21 PROCEDURE — H0047 ALCOHOL/DRUG ABUSE SVC NOS: HCPCS

## 2025-08-22 ENCOUNTER — TELEMEDICINE (OUTPATIENT)
Dept: BEHAVIORAL/MENTAL HEALTH CLINIC | Facility: CLINIC | Age: 25
End: 2025-08-22
Payer: COMMERCIAL

## 2025-08-22 DIAGNOSIS — F63.0: Primary | ICD-10-CM

## 2025-08-22 DIAGNOSIS — F12.11 CANNABIS ABUSE, IN REMISSION: ICD-10-CM

## 2025-08-22 DIAGNOSIS — F31.32 BIPOLAR I DISORDER, MOST RECENT EPISODE DEPRESSED, MODERATE (HCC): ICD-10-CM

## 2025-08-22 PROBLEM — F31.5 BIPOLAR DISORDER, CURR EPISODE DEPRESSED, SEVERE, W/PSYCHOTIC FEATURES (HCC): Status: RESOLVED | Noted: 2019-10-04 | Resolved: 2025-08-22

## 2025-08-22 PROCEDURE — 90832 PSYTX W PT 30 MINUTES: CPT
